# Patient Record
Sex: FEMALE | URBAN - METROPOLITAN AREA
[De-identification: names, ages, dates, MRNs, and addresses within clinical notes are randomized per-mention and may not be internally consistent; named-entity substitution may affect disease eponyms.]

---

## 2018-05-10 ENCOUNTER — APPOINTMENT (RX ONLY)
Dept: URBAN - METROPOLITAN AREA CLINIC 126 | Facility: CLINIC | Age: 78
Setting detail: DERMATOLOGY
End: 2018-05-10

## 2018-05-10 DIAGNOSIS — L12.0 BULLOUS PEMPHIGOID: ICD-10-CM | Status: IMPROVED

## 2018-05-10 PROBLEM — I10 ESSENTIAL (PRIMARY) HYPERTENSION: Status: ACTIVE | Noted: 2018-05-10

## 2018-05-10 PROCEDURE — 99212 OFFICE O/P EST SF 10 MIN: CPT

## 2018-05-10 PROCEDURE — ? TREATMENT REGIMEN

## 2018-05-10 PROCEDURE — ? PRESCRIPTION

## 2018-05-10 PROCEDURE — ? COUNSELING

## 2018-05-10 RX ORDER — DOXYCYCLINE HYCLATE 100 MG/1
CAPSULE, GELATIN COATED ORAL
Qty: 30 | Refills: 3 | Status: ERX | COMMUNITY
Start: 2018-05-10

## 2018-05-10 RX ORDER — CLOBETASOL PROPIONATE 0.5 MG/G
CREAM TOPICAL BID
Qty: 1 | Refills: 3 | Status: ERX | COMMUNITY
Start: 2018-05-10

## 2018-05-10 RX ADMIN — DOXYCYCLINE HYCLATE: 100 CAPSULE, GELATIN COATED ORAL at 20:24

## 2018-05-10 RX ADMIN — CLOBETASOL PROPIONATE: 0.5 CREAM TOPICAL at 20:23

## 2018-05-10 ASSESSMENT — LOCATION ZONE DERM
LOCATION ZONE: LEG
LOCATION ZONE: TRUNK

## 2018-05-10 ASSESSMENT — LOCATION DETAILED DESCRIPTION DERM
LOCATION DETAILED: RIGHT LATERAL ABDOMEN
LOCATION DETAILED: RIGHT ANTERIOR PROXIMAL THIGH

## 2018-05-10 ASSESSMENT — LOCATION SIMPLE DESCRIPTION DERM
LOCATION SIMPLE: RIGHT THIGH
LOCATION SIMPLE: ABDOMEN

## 2018-05-10 ASSESSMENT — SEVERITY ASSESSMENT: SEVERITY: MILD

## 2018-05-10 NOTE — PROCEDURE: TREATMENT REGIMEN
Discontinue Regimen: Mupirocin and TAC
Plan: Clobetasol bid prn to new lesions\\nH&E suggestive of BP, DIF negative. Will have biopsy reviewed by Dr. Russ - r/o BP vs drug induced BP. Patient takes Lasix and Aleve which may be associated with drug induced BP. \\nMay consider repeat biopsy vs Cellcdpt for flares in the future
Detail Level: Zone
Continue Regimen: Doxycycline 100 mg P.O. qday

## 2018-08-06 ENCOUNTER — APPOINTMENT (RX ONLY)
Dept: URBAN - METROPOLITAN AREA CLINIC 126 | Facility: CLINIC | Age: 78
Setting detail: DERMATOLOGY
End: 2018-08-06

## 2018-08-06 DIAGNOSIS — L24.2 IRRITANT CONTACT DERMATITIS DUE TO SOLVENTS: ICD-10-CM

## 2018-08-06 DIAGNOSIS — L12.0 BULLOUS PEMPHIGOID: ICD-10-CM | Status: IMPROVED

## 2018-08-06 PROCEDURE — ? COUNSELING

## 2018-08-06 PROCEDURE — 99211 OFF/OP EST MAY X REQ PHY/QHP: CPT

## 2018-08-06 ASSESSMENT — LOCATION ZONE DERM
LOCATION ZONE: TRUNK
LOCATION ZONE: FINGER

## 2018-08-06 ASSESSMENT — LOCATION SIMPLE DESCRIPTION DERM
LOCATION SIMPLE: ABDOMEN
LOCATION SIMPLE: RIGHT RING FINGER

## 2018-08-06 ASSESSMENT — LOCATION DETAILED DESCRIPTION DERM
LOCATION DETAILED: RIGHT MID DORSAL RING FINGER
LOCATION DETAILED: LEFT RIB CAGE

## 2018-08-06 NOTE — HPI: SKIN IRRITATION
How Severe Is Your Skin Irritation?: mild
Additional History: Patient was using an over the counter callous remover.

## 2018-11-13 ENCOUNTER — APPOINTMENT (RX ONLY)
Dept: URBAN - METROPOLITAN AREA CLINIC 126 | Facility: CLINIC | Age: 78
Setting detail: DERMATOLOGY
End: 2018-11-13

## 2018-11-13 DIAGNOSIS — L12.0 BULLOUS PEMPHIGOID: ICD-10-CM | Status: RESOLVED

## 2018-11-13 PROCEDURE — ? PRESCRIPTION

## 2018-11-13 PROCEDURE — ? COUNSELING

## 2018-11-13 PROCEDURE — 99212 OFFICE O/P EST SF 10 MIN: CPT

## 2018-11-13 RX ORDER — DOXYCYCLINE HYCLATE 100 MG/1
CAPSULE, GELATIN COATED ORAL
Qty: 30 | Refills: 1 | Status: ERX

## 2018-11-13 ASSESSMENT — LOCATION ZONE DERM: LOCATION ZONE: TRUNK

## 2018-11-13 ASSESSMENT — LOCATION DETAILED DESCRIPTION DERM: LOCATION DETAILED: LEFT RIB CAGE

## 2018-11-13 ASSESSMENT — LOCATION SIMPLE DESCRIPTION DERM: LOCATION SIMPLE: ABDOMEN

## 2019-06-06 ENCOUNTER — APPOINTMENT (RX ONLY)
Dept: URBAN - METROPOLITAN AREA CLINIC 126 | Facility: CLINIC | Age: 79
Setting detail: DERMATOLOGY
End: 2019-06-06

## 2019-06-06 DIAGNOSIS — L12.0 BULLOUS PEMPHIGOID: ICD-10-CM

## 2019-06-06 PROCEDURE — ? PRESCRIPTION

## 2019-06-06 PROCEDURE — ? INVENTORY

## 2019-06-06 PROCEDURE — ? TREATMENT REGIMEN

## 2019-06-06 PROCEDURE — ? COUNSELING

## 2019-06-06 PROCEDURE — 99212 OFFICE O/P EST SF 10 MIN: CPT

## 2019-06-06 RX ORDER — CLOBETASOL PROPIONATE 0.5 MG/G
OINTMENT TOPICAL
Qty: 1 | Refills: 3 | Status: ERX | COMMUNITY
Start: 2019-06-06

## 2019-06-06 RX ORDER — DOXYCYCLINE HYCLATE 100 MG/1
CAPSULE, GELATIN COATED ORAL
Qty: 30 | Refills: 3 | Status: ERX

## 2019-06-06 RX ADMIN — CLOBETASOL PROPIONATE: 0.5 OINTMENT TOPICAL at 17:52

## 2019-06-06 ASSESSMENT — LOCATION DETAILED DESCRIPTION DERM
LOCATION DETAILED: RIGHT LATERAL ABDOMEN
LOCATION DETAILED: LEFT RIB CAGE

## 2019-06-06 ASSESSMENT — LOCATION ZONE DERM: LOCATION ZONE: TRUNK

## 2019-06-06 ASSESSMENT — LOCATION SIMPLE DESCRIPTION DERM: LOCATION SIMPLE: ABDOMEN

## 2019-06-06 NOTE — PROCEDURE: TREATMENT REGIMEN
Detail Level: Simple
Plan: Clobetasol bid prn to new lesions\\nStart Doxycycline 100mg po qday for flare, may increase to bid vs systemic steroids as indicated for flare. \\nH&E performed in 2018 was suggestive of BP, however DIF negative.  Of note, patient takes Lasix and Aleve which may be associated with drug induced BP. \\n\\nMay consider repeat biopsy vs Cellcept for flares in the future. \\n\\Brittanie patient reports flares of her BP whenever she has chemotherapy, we will have the patient to notify office of chemotherapeutic she is currently undergoing.